# Patient Record
Sex: FEMALE | URBAN - METROPOLITAN AREA
[De-identification: names, ages, dates, MRNs, and addresses within clinical notes are randomized per-mention and may not be internally consistent; named-entity substitution may affect disease eponyms.]

---

## 2018-09-27 ENCOUNTER — APPOINTMENT (RX ONLY)
Dept: URBAN - METROPOLITAN AREA CLINIC 89 | Facility: CLINIC | Age: 43
Setting detail: DERMATOLOGY
End: 2018-09-27

## 2018-09-27 DIAGNOSIS — L20.89 OTHER ATOPIC DERMATITIS: ICD-10-CM

## 2018-09-27 DIAGNOSIS — L70.0 ACNE VULGARIS: ICD-10-CM

## 2018-09-27 PROBLEM — M12.9 ARTHROPATHY, UNSPECIFIED: Status: ACTIVE | Noted: 2018-09-27

## 2018-09-27 PROBLEM — L20.84 INTRINSIC (ALLERGIC) ECZEMA: Status: ACTIVE | Noted: 2018-09-27

## 2018-09-27 PROCEDURE — 99203 OFFICE O/P NEW LOW 30 MIN: CPT

## 2018-09-27 PROCEDURE — ? TREATMENT REGIMEN

## 2018-09-27 PROCEDURE — ? PRESCRIPTION

## 2018-09-27 RX ORDER — DOXYCYCLINE HYCLATE 100 MG/1
MG CAPSULE, GELATIN COATED ORAL BID
Qty: 60 | Refills: 1 | Status: ERX | COMMUNITY
Start: 2018-09-27

## 2018-09-27 RX ORDER — BETAMETHASONE DIPROPIONATE 0.5 MG/G
% CREAM TOPICAL BID PRN
Qty: 1 | Refills: 1 | Status: ERX | COMMUNITY
Start: 2018-09-27

## 2018-09-27 RX ADMIN — BETAMETHASONE DIPROPIONATE %: 0.5 CREAM TOPICAL at 13:55

## 2018-09-27 RX ADMIN — DOXYCYCLINE HYCLATE MG: 100 CAPSULE, GELATIN COATED ORAL at 13:54

## 2018-09-27 ASSESSMENT — LOCATION SIMPLE DESCRIPTION DERM
LOCATION SIMPLE: LEFT CHEEK
LOCATION SIMPLE: RIGHT CHEEK
LOCATION SIMPLE: LEFT BREAST
LOCATION SIMPLE: SUPERIOR FOREHEAD

## 2018-09-27 ASSESSMENT — LOCATION ZONE DERM
LOCATION ZONE: TRUNK
LOCATION ZONE: FACE

## 2018-09-27 ASSESSMENT — LOCATION DETAILED DESCRIPTION DERM
LOCATION DETAILED: LEFT MEDIAL BREAST 9-10:00 REGION
LOCATION DETAILED: SUPERIOR MID FOREHEAD
LOCATION DETAILED: LEFT INFERIOR CENTRAL MALAR CHEEK
LOCATION DETAILED: RIGHT INFERIOR CENTRAL MALAR CHEEK

## 2018-09-27 NOTE — PROCEDURE: TREATMENT REGIMEN
Detail Level: Zone
Otc Regimen: Differin gel qd. Fredo wash on occasion.
Otc Regimen: Cerave moisturizer
Plan: Start Differin daily. Doxycycline bid x 1-2 weeks PRN flares